# Patient Record
Sex: MALE | Race: WHITE | URBAN - METROPOLITAN AREA
[De-identification: names, ages, dates, MRNs, and addresses within clinical notes are randomized per-mention and may not be internally consistent; named-entity substitution may affect disease eponyms.]

---

## 2019-09-10 ENCOUNTER — APPOINTMENT (RX ONLY)
Dept: URBAN - METROPOLITAN AREA CLINIC 122 | Facility: CLINIC | Age: 24
Setting detail: DERMATOLOGY
End: 2019-09-10

## 2019-09-10 DIAGNOSIS — L74.51 PRIMARY FOCAL HYPERHIDROSIS: ICD-10-CM

## 2019-09-10 DIAGNOSIS — B36.0 PITYRIASIS VERSICOLOR: ICD-10-CM

## 2019-09-10 PROBLEM — L74.519 PRIMARY FOCAL HYPERHIDROSIS, UNSPECIFIED: Status: ACTIVE | Noted: 2019-09-10

## 2019-09-10 PROCEDURE — 99203 OFFICE O/P NEW LOW 30 MIN: CPT

## 2019-09-10 PROCEDURE — ? COUNSELING

## 2019-09-10 PROCEDURE — ? PRESCRIPTION

## 2019-09-10 RX ORDER — GLYCOPYRROLATE 2 MG/1
ONE TABLET ORAL TID
Qty: 90 | Refills: 11 | Status: ERX | COMMUNITY
Start: 2019-09-10

## 2019-09-10 RX ORDER — KETOCONAZOLE 200 MG/1
ONE TABLET ORAL DAILY
Qty: 7 | Refills: 0 | Status: ERX | COMMUNITY
Start: 2019-09-10

## 2019-09-10 RX ADMIN — KETOCONAZOLE ONE: 200 TABLET ORAL at 18:50

## 2019-09-10 RX ADMIN — GLYCOPYRROLATE ONE: 2 TABLET ORAL at 18:52

## 2019-09-10 NOTE — HPI: DISCOLORATION
How Severe Is Your Skin Discoloration?: mild
Additional History: Discoloration of back, moisture on back makes skin flake.